# Patient Record
(demographics unavailable — no encounter records)

---

## 2025-07-18 NOTE — PHYSICAL EXAM
[Chaperoned Physical Exam] : A chaperone was present in the examining room during all aspects of the physical examination. [PA] : PA [Appropriately responsive] : appropriately responsive [Alert] : alert [No Acute Distress] : no acute distress [No Lymphadenopathy] : no lymphadenopathy [Soft] : soft [Non-tender] : non-tender [Oriented x3] : oriented x3 [Examination Of The Breasts] : a normal appearance [No Masses] : no breast masses were palpable [Labia Majora] : normal [Labia Minora] : normal [IUD String] : an IUD string was noted [Normal] : normal [Uterine Adnexae] : normal [FreeTextEntry2] : Leisa Quintero [FreeTextEntry5] : pap taken

## 2025-07-18 NOTE — HISTORY OF PRESENT ILLNESS
[FreeTextEntry1] : 39yo  female with an LMP of 25 presents today for new gyn visit  Menstrual triad: 14 x 28 x 1-5  Gyn:  No hx of abnormal pap  IUD ? Kyleena   OB: 3/2022 F [id number] C/S (Hyperemesis) 2022 medical TOP - had blood transfusion (3) due to loss of blood at the time of TOP   PMHx: Hx of blood transfusion Genetic testing- negative HSV  Sx: C/S  FHx: Pat GM: Breast cancer 87yo  SH :, now engaged Non smoker  Recently laid off, prior Non-profit, wedding planning, and finance in the past  [Mammogramdate] : 7/21/2025 [TextBox_19] : scheduled @ [facility] [PapSmeardate] : 2022 [ColonoscopyDate] : not yet

## 2025-07-18 NOTE — DISCUSSION/SUMMARY
[FreeTextEntry1] : Health Maintenance:   -Pap with HPV -Mammo Rx -TBSE -colonoscopy guidelines reviewed with patient -Achieve Vit D levels of 30-40, intake of 1100 mg daily calcium mostly thru dark green leafy greens and milk products, exercise 30 minutes TIW   IUD contraception - Strings visualized  -Kyleena 2022